# Patient Record
Sex: MALE | Race: WHITE
[De-identification: names, ages, dates, MRNs, and addresses within clinical notes are randomized per-mention and may not be internally consistent; named-entity substitution may affect disease eponyms.]

---

## 2020-01-27 ENCOUNTER — HOSPITAL ENCOUNTER (OUTPATIENT)
Dept: HOSPITAL 53 - M LRY | Age: 50
End: 2020-01-27
Attending: NURSE PRACTITIONER
Payer: COMMERCIAL

## 2020-01-27 DIAGNOSIS — M54.42: Primary | ICD-10-CM

## 2020-01-27 NOTE — REP
Lumbar spine five views:

There are no comparisons.

Vertebral body heights, interspacing alignment are normal.

There are large bridging osteophytes throughout the lumbar spine compatible with

multilevel degenerative disc disease.

8.  Pedicles are unremarkable.  The facets are unremarkable.  Sacroiliac

articulations are unremarkable.

Impression:

Multilevel degenerative disc disease.

 

 

Electronically Signed by

Gordo Mattson MD 01/27/2020 01:06 P